# Patient Record
Sex: MALE
[De-identification: names, ages, dates, MRNs, and addresses within clinical notes are randomized per-mention and may not be internally consistent; named-entity substitution may affect disease eponyms.]

---

## 2020-03-13 ENCOUNTER — HOSPITAL ENCOUNTER (INPATIENT)
Dept: HOSPITAL 92 - ERS | Age: 50
LOS: 4 days | Discharge: HOME HEALTH | DRG: 493 | End: 2020-03-17
Attending: SPECIALIST | Admitting: SPECIALIST
Payer: COMMERCIAL

## 2020-03-13 VITALS — BODY MASS INDEX: 44.3 KG/M2

## 2020-03-13 DIAGNOSIS — I25.2: ICD-10-CM

## 2020-03-13 DIAGNOSIS — S00.81XA: ICD-10-CM

## 2020-03-13 DIAGNOSIS — S82.52XA: ICD-10-CM
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
## 2020-03-13 DIAGNOSIS — S52.501A: ICD-10-CM

## 2020-03-13 DIAGNOSIS — I10: ICD-10-CM
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
## 2020-03-13 DIAGNOSIS — S82.141A: Primary | ICD-10-CM

## 2020-03-13 DIAGNOSIS — E11.9: ICD-10-CM

## 2020-03-13 DIAGNOSIS — Z88.8: ICD-10-CM

## 2020-03-13 DIAGNOSIS — Z79.4: ICD-10-CM

## 2020-03-13 DIAGNOSIS — Z90.49: ICD-10-CM

## 2020-03-13 LAB
ALBUMIN SERPL BCG-MCNC: 3.7 G/DL (ref 3.5–5)
ALP SERPL-CCNC: 90 U/L (ref 40–110)
ALT SERPL W P-5'-P-CCNC: 96 U/L (ref 8–55)
ANION GAP SERPL CALC-SCNC: 14 MMOL/L (ref 10–20)
APTT PPP: 27 SEC (ref 22.9–36.1)
AST SERPL-CCNC: 67 U/L (ref 5–34)
BASOPHILS # BLD AUTO: 0.1 THOU/UL (ref 0–0.2)
BASOPHILS NFR BLD AUTO: 0.5 % (ref 0–1)
BILIRUB SERPL-MCNC: 0.3 MG/DL (ref 0.2–1.2)
BUN SERPL-MCNC: 16 MG/DL (ref 8.9–20.6)
CALCIUM SERPL-MCNC: 8.7 MG/DL (ref 7.8–10.44)
CHLORIDE SERPL-SCNC: 105 MMOL/L (ref 98–107)
CO2 SERPL-SCNC: 23 MMOL/L (ref 22–29)
CREAT CL PREDICTED SERPL C-G-VRATE: 0 ML/MIN (ref 70–130)
EOSINOPHIL # BLD AUTO: 0.7 THOU/UL (ref 0–0.7)
EOSINOPHIL NFR BLD AUTO: 5.8 % (ref 0–10)
GLOBULIN SER CALC-MCNC: 3.3 G/DL (ref 2.4–3.5)
GLUCOSE SERPL-MCNC: 116 MG/DL (ref 70–105)
HGB BLD-MCNC: 14.8 G/DL (ref 14–18)
INR PPP: 0.9
LIPASE SERPL-CCNC: 17 U/L (ref 8–78)
LYMPHOCYTES # BLD: 2.3 THOU/UL (ref 1.2–3.4)
LYMPHOCYTES NFR BLD AUTO: 19.2 % (ref 21–51)
MCH RBC QN AUTO: 28.2 PG (ref 27–31)
MCV RBC AUTO: 87.5 FL (ref 78–98)
MONOCYTES # BLD AUTO: 1 THOU/UL (ref 0.11–0.59)
MONOCYTES NFR BLD AUTO: 8.7 % (ref 0–10)
NEUTROPHILS # BLD AUTO: 7.8 THOU/UL (ref 1.4–6.5)
NEUTROPHILS NFR BLD AUTO: 65.8 % (ref 42–75)
PLATELET # BLD AUTO: 207 THOU/UL (ref 130–400)
POTASSIUM SERPL-SCNC: 4.2 MMOL/L (ref 3.5–5.1)
PROTHROMBIN TIME: 11.8 SEC (ref 12–14.7)
RBC # BLD AUTO: 5.24 MILL/UL (ref 4.7–6.1)
SODIUM SERPL-SCNC: 138 MMOL/L (ref 136–145)
WBC # BLD AUTO: 11.8 THOU/UL (ref 4.8–10.8)

## 2020-03-13 PROCEDURE — 83735 ASSAY OF MAGNESIUM: CPT

## 2020-03-13 PROCEDURE — 83036 HEMOGLOBIN GLYCOSYLATED A1C: CPT

## 2020-03-13 PROCEDURE — 94760 N-INVAS EAR/PLS OXIMETRY 1: CPT

## 2020-03-13 PROCEDURE — 36416 COLLJ CAPILLARY BLOOD SPEC: CPT

## 2020-03-13 PROCEDURE — 83690 ASSAY OF LIPASE: CPT

## 2020-03-13 PROCEDURE — 85025 COMPLETE CBC W/AUTO DIFF WBC: CPT

## 2020-03-13 PROCEDURE — 85730 THROMBOPLASTIN TIME PARTIAL: CPT

## 2020-03-13 PROCEDURE — 96361 HYDRATE IV INFUSION ADD-ON: CPT

## 2020-03-13 PROCEDURE — 29125 APPL SHORT ARM SPLINT STATIC: CPT

## 2020-03-13 PROCEDURE — 96374 THER/PROPH/DIAG INJ IV PUSH: CPT

## 2020-03-13 PROCEDURE — 86900 BLOOD TYPING SEROLOGIC ABO: CPT

## 2020-03-13 PROCEDURE — 80053 COMPREHEN METABOLIC PANEL: CPT

## 2020-03-13 PROCEDURE — 80048 BASIC METABOLIC PNL TOTAL CA: CPT

## 2020-03-13 PROCEDURE — C1769 GUIDE WIRE: HCPCS

## 2020-03-13 PROCEDURE — 96375 TX/PRO/DX INJ NEW DRUG ADDON: CPT

## 2020-03-13 PROCEDURE — 86901 BLOOD TYPING SEROLOGIC RH(D): CPT

## 2020-03-13 PROCEDURE — 86850 RBC ANTIBODY SCREEN: CPT

## 2020-03-13 PROCEDURE — 83605 ASSAY OF LACTIC ACID: CPT

## 2020-03-13 PROCEDURE — 36415 COLL VENOUS BLD VENIPUNCTURE: CPT

## 2020-03-13 PROCEDURE — S0020 INJECTION, BUPIVICAINE HYDRO: HCPCS

## 2020-03-13 PROCEDURE — S0028 INJECTION, FAMOTIDINE, 20 MG: HCPCS

## 2020-03-13 PROCEDURE — 93010 ELECTROCARDIOGRAM REPORT: CPT

## 2020-03-13 PROCEDURE — 71045 X-RAY EXAM CHEST 1 VIEW: CPT

## 2020-03-13 PROCEDURE — 76000 FLUOROSCOPY <1 HR PHYS/QHP: CPT

## 2020-03-13 PROCEDURE — 85610 PROTHROMBIN TIME: CPT

## 2020-03-13 PROCEDURE — G0390 TRAUMA RESPONS W/HOSP CRITI: HCPCS

## 2020-03-13 PROCEDURE — 96376 TX/PRO/DX INJ SAME DRUG ADON: CPT

## 2020-03-13 PROCEDURE — 27760 CLTX MEDIAL ANKLE FX: CPT

## 2020-03-13 PROCEDURE — 84100 ASSAY OF PHOSPHORUS: CPT

## 2020-03-13 PROCEDURE — C1713 ANCHOR/SCREW BN/BN,TIS/BN: HCPCS

## 2020-03-13 PROCEDURE — 93005 ELECTROCARDIOGRAM TRACING: CPT

## 2020-03-13 RX ADMIN — DOCUSATE SODIUM 50 MG AND SENNOSIDES 8.6 MG SCH: 8.6; 5 TABLET, FILM COATED ORAL at 22:05

## 2020-03-13 RX ADMIN — FAMOTIDINE SCH MG: 10 INJECTION, SOLUTION INTRAVENOUS at 22:05

## 2020-03-13 NOTE — RAD
RIGHT KNEE FOUR VIEWS:

3/13/20

 

HISTORY: 

Injury right knee pain. 

 

FINDINGS/IMPRESSION: 

There is a comminuted fracture involving the proximal tibia with fracture lines extending into the la
teral and medial tibial plateaus. There is hemarthrosis. 

 

POS: [facility]

## 2020-03-13 NOTE — RAD
Portable frontal chest radiograph:

3/13/2020



COMPARISON: 5/21/2008



HISTORY: Preoperative patient



FINDINGS: There is mild elevation of the right hemidiaphragm and mild prominence of the cardiac silho
uette. No pneumothorax or pleural fluid. No focal consolidation or alveolar edema.



IMPRESSION: No acute findings..



Reported By: Doni Mcguire 

Electronically Signed:  3/13/2020 5:53 PM

## 2020-03-13 NOTE — RAD
Radiograph left ankle 3 views:



DATE:

3/13/2020



Time:

3:27 PM



HISTORY:

49-year-old male with acute traumatic left ankle pain due to fall from ladder.



FINDINGS:

Fracture across midportion of medial malleolus, with approximately 20-30% bone width lateral displace
ment, and slight varus rotation, of distal fragment. No fracture of lateral or posterior malleolus.

Except for the inferior medial aspect of the ankle mortise, which is widened because of the displaced
 fracture, the rest of the ankle mortise is congruent. Bilateral medial and lateral soft tissue

edema, especially medial. Talar dome maintained.



IMPRESSION:

Acute, traumatic, displaced medial malleolus fracture.



Reported By: Nicko Michele 

Electronically Signed:  3/13/2020 4:06 PM

## 2020-03-13 NOTE — RAD
Left wrist 3 views:

3/13/2020



COMPARISON: None available



HISTORY: Fall, trauma, pain



FINDINGS: A 3 view examination of the left wrist is provided. There is dorsal soft tissue swelling. N
o displaced fracture or evidence of dislocation is seen.



On the oblique view there is questionable lucency involving the distal left radius medially which cou
ld represent overlapping osseous structures or subtle fracture lucency.



IMPRESSION: No displaced fracture is seen. There is significant dorsal soft tissue swelling with a po
ssible subtle nondisplaced distal left radial fracture on oblique imaging. Recommend immobilization

and follow-up imaging in 7-10 days.



Reported By: Doni Mcguire 

Electronically Signed:  3/13/2020 5:44 PM

## 2020-03-14 LAB
ANION GAP SERPL CALC-SCNC: 13 MMOL/L (ref 10–20)
BASOPHILS # BLD AUTO: 0.1 THOU/UL (ref 0–0.2)
BASOPHILS NFR BLD AUTO: 0.7 % (ref 0–1)
BUN SERPL-MCNC: 13 MG/DL (ref 8.9–20.6)
CALCIUM SERPL-MCNC: 8.3 MG/DL (ref 7.8–10.44)
CHLORIDE SERPL-SCNC: 106 MMOL/L (ref 98–107)
CO2 SERPL-SCNC: 23 MMOL/L (ref 22–29)
CREAT CL PREDICTED SERPL C-G-VRATE: 233 ML/MIN (ref 70–130)
EOSINOPHIL # BLD AUTO: 0.7 THOU/UL (ref 0–0.7)
EOSINOPHIL NFR BLD AUTO: 6.7 % (ref 0–10)
GLUCOSE SERPL-MCNC: 129 MG/DL (ref 70–105)
HGB BLD-MCNC: 14.3 G/DL (ref 14–18)
LYMPHOCYTES # BLD: 3.1 THOU/UL (ref 1.2–3.4)
LYMPHOCYTES NFR BLD AUTO: 30.4 % (ref 21–51)
MAGNESIUM SERPL-MCNC: 2 MG/DL (ref 1.6–2.6)
MCH RBC QN AUTO: 29.8 PG (ref 27–31)
MCV RBC AUTO: 88.7 FL (ref 78–98)
MONOCYTES # BLD AUTO: 1.1 THOU/UL (ref 0.11–0.59)
MONOCYTES NFR BLD AUTO: 10.9 % (ref 0–10)
NEUTROPHILS # BLD AUTO: 5.3 THOU/UL (ref 1.4–6.5)
NEUTROPHILS NFR BLD AUTO: 51.3 % (ref 42–75)
PLATELET # BLD AUTO: 177 THOU/UL (ref 130–400)
POTASSIUM SERPL-SCNC: 4.3 MMOL/L (ref 3.5–5.1)
RBC # BLD AUTO: 4.79 MILL/UL (ref 4.7–6.1)
SODIUM SERPL-SCNC: 138 MMOL/L (ref 136–145)
WBC # BLD AUTO: 10.3 THOU/UL (ref 4.8–10.8)

## 2020-03-14 PROCEDURE — 0QSG04Z REPOSITION RIGHT TIBIA WITH INTERNAL FIXATION DEVICE, OPEN APPROACH: ICD-10-PCS | Performed by: ORTHOPAEDIC SURGERY

## 2020-03-14 PROCEDURE — 0QSH04Z REPOSITION LEFT TIBIA WITH INTERNAL FIXATION DEVICE, OPEN APPROACH: ICD-10-PCS | Performed by: ORTHOPAEDIC SURGERY

## 2020-03-14 RX ADMIN — FAMOTIDINE SCH MG: 10 INJECTION, SOLUTION INTRAVENOUS at 09:21

## 2020-03-14 RX ADMIN — FAMOTIDINE SCH MG: 10 INJECTION, SOLUTION INTRAVENOUS at 20:11

## 2020-03-14 RX ADMIN — DOCUSATE SODIUM 50 MG AND SENNOSIDES 8.6 MG SCH: 8.6; 5 TABLET, FILM COATED ORAL at 09:21

## 2020-03-14 RX ADMIN — CEFAZOLIN SODIUM SCH MLS: 2 SOLUTION INTRAVENOUS at 17:12

## 2020-03-14 RX ADMIN — DOCUSATE SODIUM 50 MG AND SENNOSIDES 8.6 MG SCH TAB: 8.6; 5 TABLET, FILM COATED ORAL at 20:11

## 2020-03-14 NOTE — PRG
DATE OF SERVICE:  03/14/2020



SUBJECTIVE:  The patient was seen this evening.  He is postoperative day 0 after

fixation of bilateral lower extremity fractures.  He is on Dilaudid PCA and he

reports his pain is well controlled. 



OBJECTIVE:  VITAL SIGNS:  Temperature 98.1, pulse 92, respirations 16, oxygen

saturation 96% on 4 L nasal cannula, blood pressure 124/70. 

GENERAL:  Well-appearing middle-aged male, lying in bed with no signs of acute

distress. 

PULMONARY:  Equal chest rise and fall.  No signs of acute respiratory distress.



ASSESSMENT:  

1. Status post fall from ladder about 6 feet.

2. Possible left radius fracture.

3. Right tibial plateau fracture.

4. Left medial malleolus fracture.

5. Abrasions to face.

6. History of myocardial infarction, diabetes, and hypertension.



PLAN:  Continue current diet and pain regimen.  Continue physical and occupational

therapy.  We will start Lovenox tomorrow for DVT prophylaxis.  He is to be in neuro

chair starting tomorrow and then after that, possibly a wheelchair.  He is pending

placement at acute rehab facility. 







Job ID:  [id number]

## 2020-03-14 NOTE — HP
REQUESTING PHYSICIAN:  Nurse practitioner, Lamar Krishnan.



CONSULTS:  Orthopedic Surgery, Dr. Bal.



PRIMARY CARE PHYSICIAN:  Dr. Montenegro.



CHIEF COMPLAINT:  Fall from a ladder approximately 6-8 feet.



HISTORY OF PRESENT ILLNESS:  This is a 49-year-old gentleman, who presented to 
the

emergency room via EMS after falling from a ladder.  The patient was attempting 
to

trim a tree when another limb fell causing the ladder to fall.  The patient 
denies

losing any consciousness.  The patient denied feeling weak, dizzy, or having any

shortness of breath prior to falling.  The patient reports a branch, it did hit 
his

forehead when he was cutting down a limb.  The patient reported hearing a pop 
to his

right knee and reported immediate pain.  The patient also had pain to his left

ankle.  The patient was given morphine, hydrocodone, and Zofran.  The patient 
was

also given 1 L of normal saline in the emergency room.  The patient's left upper

extremity was splinted, left lower extremity splinted and right lower extremity

placed in a knee immobilizer.  Trauma Service was asked to admit the patient.  
The

patient also sustained an abrasion to his nose and right eyebrow that was not 
able

to be sutured.  Steri-Strips were applied in the emergency room. 



REVIEW OF SYSTEMS:  A 10-point review of systems is negative unless otherwise

indicated in the above HPI. 



PAST MEDICAL HISTORY:  Hypertension, diabetes, myocardial infarction at age 31 
due

to an electrical imbalance from his brain and heart.  The patient denies having 
any

surgery and reports that he did not have any blockage in his heart.  Reports 
that

his cardiologist at that time was Dr. Parish. 



PAST SURGICAL HISTORY:  Appendectomy, tonsillectomy, and nasal surgery.



SOCIAL HISTORY:  Drinks occasionally alcohol.  Denies any drug use, denies any

tobacco use. 



ALLERGIES:  LISINOPRIL CAUSES COUGH.



CURRENT MEDICATIONS:  

1. Glucosamine, metformin 500 mg 2 times a day.

2. Losartan 50 mg once a day.



OBJECTIVE:  VITAL SIGNS:  Blood pressure 118/64, pulse 77, respirations 16, 
SpO2 of

96% on room air, temperature 98.3. 

GENERAL:  Middle aged male, well-appearing, no acute distress. 

HEENT:  Head is normocephalic, abrasion to right eyebrow with Steri-Strips in 
place,

abrasion to nose, midface stable, oropharynx unremarkable, mucous membranes 
moist. 

NECK:  No cervical spine tenderness, normal range of motion.  Trachea is 
midline. 

CHEST:  Equal chest rise and fall, no obvious injuries, bilateral breath sounds

clear with no wheezing, rales, or rhonchi. 

CARDIAC:  Regular rate, regular rhythm, no murmur, occasional PVC on cardiac

monitor. 

ABDOMEN:  Obese, soft, nontender. 

PELVIS:  Stable. 

EXTREMITIES:  Left upper extremity splinted.  Left lower extremity splinted.  
Right

lower extremity in a knee immobilizer.  Distal pulses intact in all extremities,

normal sensation and movement in all extremities. 

NEUROLOGIC:  No focal deficits.  GCS 15.  Cranial nerves are intact.



LABORATORY DATA:  WBC 11.8, RBC 5.24, hemoglobin 14.8, hematocrit 45.9, 
platelets

207.  PT 11.8, INR 0.9, APTT 27.0.  Sodium 138, potassium 4.2, chloride 105, 
BUN 16,

creatinine 0.81, estimated GFR greater than 90.  Glucose 116, lactate 1.9, 
calcium

8.7, total bilirubin 0.3, AST 67, ALT 96, alkaline phos 90, albumin 3.7, lipase 
17. 



DIAGNOSTIC DATA:  Ankle x-ray, impression:  Left traumatic displaced medial

malleolus fracture. 



Right knee x-ray, impression:  Comminuted fracture involved in the proximal 
tibia

with fracture lines extending into the lateral and medial tibial plateaus. 



Left wrist x-ray, impression:  Possible subtle nondisplaced distal left radial

fracture on oblique imaging. 



Chest x-ray, impression:  Mild elevation of the right hemidiaphragm and mild

prominence of the cardiac silhouette.  No pneumothorax or pleural fluid.  No

consolidation or alveolar edema.  No acute findings. 



IMPRESSION:  

1. Fall from ladder, approximately 6-8 feet.

2. Facial abrasions.

3. Left medial malleolar fracture.

4. Right tibial plateau fracture.

5. Left distal radius fracture.

6. Acute traumatic pain.

7. History of hypertension, diabetes type 2, myocardial infarction with no 
blockage.



PLAN:  Admit the patient to the surgical floor.  Pain control.  The patient 
will be

n.p.o. after midnight.  Dr. Bal plans to take the patient to the OR 
tomorrow for

repair of his fractures.  PT, OT to evaluate and treat postop.  A rehab screen 
will

be placed in case the patient does need additional rehab.  The plan will be

discussed with the attending.  The plan was discussed with the patient and 
family,

who agrees. 







Job ID:  [id number]



MTDD

## 2020-03-14 NOTE — CON
DATE OF CONSULTATION:  03/14/2020



HISTORY OF PRESENT ILLNESS:  Mr. Espinoza is a 49-year-old male who fell

approximately 8 feet off a ladder and immediate pain in the right knee, left ankle,

and left wrist.  The patient had x-rays, which showed a comminuted fracture of the

medial and lateral tibial plateaus of the right knee, displaced fracture of the

medial malleolus of the left ankle, and a nondisplaced fracture of the distal aspect

of the radius on the left wrist.  The patient has no neurologic complaints in any of

his extremities. 



PAST MEDICAL HISTORY/MEDICAL ILLNESSES:  

1. Hypertension.

2. Diabetes.

3. Had a myocardial infarction at age 31 due to electrical imbalance from his brain

and heart. 



PAST SURGICAL HISTORY:  

1. Appendectomy.

2. Tonsillectomy.

3. Nasal surgery.



ALLERGIES:  TO LISINOPRIL.



CURRENT MEDICATIONS:  

1. Glucosamine.

2. Metformin.

3. Losartan.



SOCIAL HISTORY:  The patient drinks occasional alcohol.  He denies tobacco or drug

use. 



PHYSICAL EXAMINATION:

GENERAL:  The patient is a pleasant male, alert and oriented x3, cooperative with

the examination. 

VITAL SIGNS:  Blood pressure 118/64, pulse 77, respiratory rate 16, O2 saturation

96% on room air, and temperature 98.3. 

HEENT:  Abrasion over the right eyebrow, otherwise is normal. 

NECK:  Nontender to palpation. 

BACK:  Thoracic and lumbar spine nontender. 

LUNGS:  Clear bilaterally. 

HEART:  Regular rate and rhythm. 

ABDOMEN:  Soft and nontender.  Bowel sounds positive. 

EXTREMITIES:  Left upper extremity is splinted.  Right lower extremity shows

swelling over the right knee and into the leg.  The left ankle shows swelling,

particularly in the medial aspect of the ankle.  Both lower extremities are

neurovascularly intact.  Both upper extremities also are neurovascularly intact. 



LABORATORY DATA:  White count of 8.8, hemoglobin 14.8, and hematocrit 45.9.

Creatinine 0.81, BUN 16, and glucose 116. 



IMPRESSION:  

1. Comminuted medial and lateral tibial plateau fracture of the right knee.

2. Displaced medial malleolus fracture of the left ankle.

3. Possible nondisplaced left distal radius fracture of the left wrist.

4. Diabetes.

5. Hypertension.

6. History of myocardial infarction.



PLAN:  The patient will require open reduction and internal fixation of the right

medial and lateral tibial plateaus as well as the left medial malleolus.  The left

wrist has been placed in a splint, which he will continue with.  We will obtain

additional x-rays later.  Potential risks with the condition of surgery include, but

are not limited to infection, bleeding, pain, damage to blood vessels or nerves,

nonunion, malunion, the patient may require additional surgery, DVT and PE

formation.  The patient will need to be nonweightbearing on the left ankle for about

2 months and on the right knee for about 3 months.  He should be nonweightbearing

across the left wrist until the fracture is healed as well. 







Job ID:  [id number]

## 2020-03-14 NOTE — OP
DATE OF PROCEDURE:  03/14/2020



PREOPERATIVE DIAGNOSES:  

1. Displaced medial and lateral tibial plateau fracture of the right knee.

2. Displaced left medial malleolus fracture of the left ankle.



POSTOPERATIVE DIAGNOSES:  

1. Displaced medial and lateral tibial plateau fracture of the right knee.

2. Displaced medial malleolus fracture of the left ankle.



PROCEDURE PERFORMED:  

1. Open reduction and internal fixation of medial and lateral tibial plateau

fracture, right knee. 

2. Open reduction and internal fixation of medial malleolus fracture of the left

ankle. 



ANESTHESIA:  General.



DESCRIPTION OF PROCEDURE:  The patient was given preoperative IV antibiotics, taken

to the operating room, placed in supine position.  Satisfactory general anesthesia

was performed.  The right lower extremity was sterilely prepped and draped in the

usual fashion.  After exsanguination, tourniquet was raised to 350 mmHg at the right

thigh.  A curvilinear incision was made on the anterolateral aspect of the knee and

proximal leg over the lateral aspect of the proximal tibia.  Blunt and sharp

dissection was made down to the lateral aspect of the proximal tibia and the tibial

plateau region.  A curvilinear incision was then made on the medial aspect.  A

hockey-stick type incision was made down the anteromedial aspect of the proximal leg

and the periosteum and ligaments were left intact on the medial aspect.  A large

bone clamp was used under fluoroscopic visualization.  The medial and lateral

plateau fractures, which were splayed out, were brought together and the fragments

were reduced and then internally fixed using a Synthes 3.5 four-hole LCP proximal

tibial plate on the lateral aspect and 3.5 four-hole LCP plate on the proximal

tibial plate medially.  A 3.5 cortical screw was used to pull the plate to the tibia

and the rest of the screws were 3.5 locking screws.  There were 4 proximal locking

screws laterally and 3 additional locking screws in the metaphyseal region.  On the

medial aspect, there were three 3.5 locking screws proximally with 4 additional

locking screws.  This again was all performed under fluoroscopic visualization and

showed good alignment of the medial and lateral tibial plateau fractures and proper

placement of the plate and screws.  The wounds were then copiously irrigated with

antibiotic solution and they were closed using #1 Vicryl to close the fascia over

the plates and then 0 Vicryl for the fat and subcutaneous tissue, and skin was

closed with skin staples.  Medial and lateral incisions were then infiltrated with a

total of 20 mL of 0.5% Marcaine with 1% lidocaine with epinephrine.  Sterile

dressing was applied.  Tourniquet was released.  Attention was then turned to the

left ankle.  The left foot, ankle, and leg were then sterilely prepped and draped in

the usual fashion.  After exsanguination, tourniquet at the proximal left calf was

raised to 250 mmHg.  A longitudinal incision was made over the medial malleolar

region and the medial malleolus initially was pulled down and the ankle joint was

irrigated with antibiotic solution.  It was then reduced and internally fixed using

two 4.0 cannulated screws.  Again, this was all performed under fluoroscopic

visualization, showed anatomic alignment of the medial malleolus and proper

reduction of the ankle joint.  The ankle was then tested for stability under

fluoroscopy and was very stable.  The wound was then irrigated with antibiotic

solution and closed using 0 Vicryl for the fat and subcutaneous tissue, and the skin

was closed skin staples.  This wound was also infiltrated with 10 mL of 0.5%

Marcaine and 1% lidocaine with epinephrine.  Sterile dressing was applied.

Tourniquet was released.  The patient was then placed back in his knee immobilizer

on the right.  He will be placed in a tall boot on the left.  The patient was then

awakened, extubated, and transferred to recovery room in stable condition. 



ESTIMATED BLOOD LOSS:  50 mL.



COMPLICATIONS:  None.



TOURNIQUET TIME:  Total tourniquet time on the right thigh was 56 minutes and total

tourniquet time on the left proximal calf was 26 minutes. 







Job ID:  [id number]

## 2020-03-14 NOTE — HP
I am dictating this at 09:40 in the morning, but I saw the patient initially at 7

o'clock this morning and have been in the operating room subsequently.  He is a

49-year-old male who fell from 6 feet off a ladder and sustained orthopedic

injuries.  He is a diabetic who has been well controlled previously and currently.

Full history and physical examination were dictated yesterday by Dee Steele, nurse

practitioner.  Dr. Bal has seen the patient in orthopedic consultation and plans

to proceed with surgery this morning.  I have reviewed the history and performed

physical examination and reviewed all studies.  My findings agree with those of Ms. Steele. 







Job ID:  [id number]

## 2020-03-14 NOTE — PRG
DATE OF SERVICE:  03/13/2020



SUBJECTIVE:  The patient was seen this evening during rounds.  He had just arrived

from the emergency department.  He was complaining of pain in his bilateral lower

extremities.  He was in no signs of acute distress. 



OBJECTIVE:  VITAL SIGNS:  Temperature 98.3, pulse 101, respirations 20, oxygen

saturation 92% on room air, blood pressure 149/52. 

GENERAL:  Well-appearing middle-aged male, sitting up in bed with no signs of acute

distress. 

PULMONARY:  Equal chest rise and fall.  No signs of acute respiratory distress. 

EXTREMITIES:  2+ pulses in all extremities.  Gross motor and sensation are intact.

He has splint to the bilateral lower and left upper extremity that are clean, dry,

and intact. 



ASSESSMENT:  

1. Status post fall from ladder about 6 feet.

2. Possible left radius fracture.

3. Right tibial plateau fracture.

4. Left medial malleolus fracture.

5. Face abrasions.

6. History of myocardial infarction.

7. Diabetes.

8. Hypertension.



PLAN:  Continue diabetic diet until midnight where he will be n.p.o.  He has LR at

125 an hour.  We will hold his metformin and continue the insulin sliding scale.

Restart losartan in the morning with hold parameters.  He is going to go to the OR

with Dr. Bal to address multiple extremity fractures.  EKG has not been

completed.  We will do that in the morning before he goes to the OR.  Postop, he

will work with Physical and Occupational Therapy and likely need placement at either

an acute rehab or skilled nursing facility as he has multiple extremity fractures. 







Job ID:  [id number]

## 2020-03-14 NOTE — PRG
DATE OF SERVICE:  03/14/2020



SUBJECTIVE:  The patient remains on the surgical floor.  Awake, alert, no distress.

The patient is postop day 0, status post open reduction and internal fixation of

medial and lateral tibial plateau fracture of right knee and displaced medial

malleolus fracture of the left ankle.  The patient's pain is currently well

controlled at this time on a PCA fentanyl pump.  The patient is tolerating a diet at

this time.  The patient voices no complaints.  The patient has good urinary output. 



OBJECTIVE:  VITAL SIGNS:  Temperature 98.6, pulse 84, respirations 20, SpO2 of 96%

on room air, blood pressure 124/75. 

GENERAL:  Well-appearing middle aged male, lying in hospital bed, in no acute

distress. 

RESPIRATORY:  Equal chest rise and fall.  Bilateral breath sounds clear.  No

distress. 

ABDOMEN:  Soft, nontender, nondistended. 

EXTREMITIES:  Moves all extremities.  Splint to left upper extremity, knee

immobilizer to right lower extremity, and splint to the left lower extremity. 

NEUROLOGIC:  No focal deficits.



LABORATORY DATA:  WBC 10.3, RBC 4.79, hemoglobin 14.3, hematocrit 42.5, platelets

177.  Sodium 138, potassium 4.3, chloride 106, BUN 13, creatinine 0.77, estimated

GFR greater than 90, glucose 128, calcium 8.3, phosphorus 4.0, magnesium 2.0. 



ASSESSMENT:  

1. Status post fall from ladder about 6 feet.

2. Possible left radius fracture, splinted.

3. Right tibial plateau fracture, postop repair.

4. Left medial malleolus fracture, postop repair.

5. Facial abrasions.

6. History of myocardial infarction, diabetes, and hypertension.



PLAN:  Continue diabetic diet.  The patient is nonweightbearing to all extremities

except his right upper extremity.  Continue PCA fentanyl pump for pain.  We will

have Physical Therapy and Occupational Therapy work with the patient.  The patient

will most likely need rehab versus skilled nursing facility due to his multiple

extremity fractures.  Plan has been discussed with the patient's family who agrees. 







Job ID:  [id number]

## 2020-03-14 NOTE — RAD
LEFT KNEE TWO VIEWS WITH INTRAOPERATIVE FLUOROSCOPY:



HISTORY:

Knee fracture.



FINDINGS:

Intraoperative fluoroscopy was provided for internal fixation, as performed by Dr. Bal. Spot fluo
roscopic images show compression sideplate, medial and lateral, and multiple screws transfixing the

medial and lateral tibial plateaus. Alignment is anatomic.



Fluoroscopy time 22 seconds.



Transcribed Date/Time: 3/14/2020 12:40 PM



Reported By: GILBERT Chan 

Electronically Signed:  3/14/2020 1:46 PM

## 2020-03-14 NOTE — RAD
LEFT ANKLE TWO VIEWS WITH INTRAOPERATIVE FLUOROSCOPY:



HISTORY:

Fracture.



FINDINGS:

Intraoperative fluoroscopy was provided for internal fixation, as performed by Dr. Bal. Spot fluo
roscopic images show two long screws transfixing the medial malleolus, in anatomic alignment.



Fluoroscopy time: 15 seconds.



Transcribed Date/Time: 3/14/2020 12:40 PM



Reported By: GILBERT Chan 

Electronically Signed:  3/14/2020 1:46 PM

## 2020-03-15 LAB
ANION GAP SERPL CALC-SCNC: 11 MMOL/L (ref 10–20)
BASOPHILS # BLD AUTO: 0 THOU/UL (ref 0–0.2)
BASOPHILS NFR BLD AUTO: 0.1 % (ref 0–1)
BUN SERPL-MCNC: 10 MG/DL (ref 8.9–20.6)
CALCIUM SERPL-MCNC: 8 MG/DL (ref 7.8–10.44)
CHLORIDE SERPL-SCNC: 105 MMOL/L (ref 98–107)
CO2 SERPL-SCNC: 25 MMOL/L (ref 22–29)
CREAT CL PREDICTED SERPL C-G-VRATE: 239 ML/MIN (ref 70–130)
EOSINOPHIL # BLD AUTO: 0.1 THOU/UL (ref 0–0.7)
EOSINOPHIL NFR BLD AUTO: 0.5 % (ref 0–10)
GLUCOSE SERPL-MCNC: 149 MG/DL (ref 70–105)
HGB BLD-MCNC: 12.7 G/DL (ref 14–18)
LYMPHOCYTES # BLD: 1.9 THOU/UL (ref 1.2–3.4)
LYMPHOCYTES NFR BLD AUTO: 14.7 % (ref 21–51)
MAGNESIUM SERPL-MCNC: 2.1 MG/DL (ref 1.6–2.6)
MCH RBC QN AUTO: 29 PG (ref 27–31)
MCV RBC AUTO: 88.8 FL (ref 78–98)
MONOCYTES # BLD AUTO: 1.5 THOU/UL (ref 0.11–0.59)
MONOCYTES NFR BLD AUTO: 11.5 % (ref 0–10)
NEUTROPHILS # BLD AUTO: 9.3 THOU/UL (ref 1.4–6.5)
NEUTROPHILS NFR BLD AUTO: 73.2 % (ref 42–75)
PLATELET # BLD AUTO: 158 THOU/UL (ref 130–400)
POTASSIUM SERPL-SCNC: 4.3 MMOL/L (ref 3.5–5.1)
RBC # BLD AUTO: 4.37 MILL/UL (ref 4.7–6.1)
SODIUM SERPL-SCNC: 137 MMOL/L (ref 136–145)
WBC # BLD AUTO: 12.7 THOU/UL (ref 4.8–10.8)

## 2020-03-15 RX ADMIN — MULTIPLE VITAMINS W/ MINERALS TAB SCH TAB: TAB at 09:05

## 2020-03-15 RX ADMIN — ASPIRIN SCH MG: 81 TABLET ORAL at 09:04

## 2020-03-15 RX ADMIN — DOCUSATE SODIUM 50 MG AND SENNOSIDES 8.6 MG SCH: 8.6; 5 TABLET, FILM COATED ORAL at 21:09

## 2020-03-15 RX ADMIN — DOCUSATE SODIUM 50 MG AND SENNOSIDES 8.6 MG SCH TAB: 8.6; 5 TABLET, FILM COATED ORAL at 09:05

## 2020-03-15 RX ADMIN — CEFAZOLIN SODIUM SCH MLS: 2 SOLUTION INTRAVENOUS at 02:19

## 2020-03-15 RX ADMIN — INSULIN HUMAN PRN UNIT: 100 INJECTION, SOLUTION PARENTERAL at 16:53

## 2020-03-15 RX ADMIN — INSULIN HUMAN PRN UNIT: 100 INJECTION, SOLUTION PARENTERAL at 13:45

## 2020-03-15 NOTE — PRG
DATE OF SERVICE:  03/15/2020



SUBJECTIVE:  The patient remains on the surgical floor, awake, alert, sitting up in

neuro chair.  The patient reports that his pain is well controlled with a PCA

fentanyl pump.  The patient had no overnight events.  The patient continues to

tolerate a diabetic diet.  The patient is nonweightbearing to left upper and lower

extremity and also right lower extremity. 



OBJECTIVE:  VITAL SIGNS:  Temperature 98.4, pulse 75, respirations 18, SpO2 of 93%

on room air, and blood pressure 117/73. 

GENERAL:  Middle aged gentleman, awake, alert, sitting up in the neuro chair, in no

acute distress. 

HEENT:  Normocephalic.  Steri-Strips to superficial, right eyebrow. 

PULMONARY:  Equal chest rise and fall, bilateral breath sounds clear. 

ABDOMEN:  Soft, nontender, and nondistended. 

EXTREMITIES:  Left upper extremity splinted.  Right lower extremity in a knee

immobilizer.  Left lower extremity splinted.  No gross edema.  Cap refill less than

2 seconds, sensation intact in all extremities.  No focal deficits. 



LABORATORY DATA:  WBC 12.7, RBC 4.37, hemoglobin 12.7, hematocrit 38.8, and

platelets 158.  Sodium 137, potassium 4.3, chloride 105, carbon dioxide 25, BUN 10,

creatinine 0.75, estimated GFR greater than 90, and glucose 149.  Hemoglobin A1c

8.2.  Calcium 8.0, phosphorus 3.6, and magnesium 2.1. 



DIAGNOSTICS:  There is no new diagnostics to review today.



ASSESSMENT:  

1. Status post fall from ladder about 6 feet.  Possible left radius fracture,

splinted. 

2. Right tibial plateau fractures, status post repair.

3. Left medial malleolus fracture, status post repair.

4. Abrasions to face.

5. History of myocardial infarction, diabetes, and hypertension.



PLAN:  Continue current diet and pain regimen.  Continue bowel regimen.  Continue to

have the patient use incentive spirometer every hour while awake.  Continue Physical

and Occupational Therapy.  We will have the patient up in the neuro chair as much as

possible during the day as the patient is nonambulatory.  Continue DVT prophylaxis.

We will start to wean the patient's PCA pump.  The plan was discussed with the

patient's family, who agrees.  The plan was discussed with the attending, who

agrees. 







Job ID:  [id number]

## 2020-03-15 NOTE — PRG
DATE OF SERVICE:  03/15/2020



SUBJECTIVE:  The patient is 1 day status post ORIF of the medial and lateral right

tibial plateaus and ORIF of the left medial malleolus.  The patient also sustained a

nondisplaced left radius fracture.  The patient is in fairly good spirits.  He was

able to get out of bed and sit in a wheelchair this morning. 



OBJECTIVE:  VITAL SIGNS:  The patient has been afebrile, O2 saturation 93% on room

air, blood pressure is good at 117/73. 

EXTREMITIES:  All 4 extremities are neurovascularly intact.



LABORATORY DATA:  Laboratory today shows hemoglobin 12.7, hematocrit 38.8.  Glucose

is slightly elevated at 149.  Rest of the chemistries are normal. 



PLAN:  The patient will continue to work with Physical and Occupational Therapy to

help in transfers.  He will need to be nonweightbearing on both lower extremities

and across the left wrist.  He will have to use left elbow and shoulder on the left

upper extremity.  The patient is considering going into a nursing home or skilled

nursing facility until he has become more independent. 







Job ID:  [id number]

## 2020-03-16 LAB
BASOPHILS # BLD AUTO: 0 THOU/UL (ref 0–0.2)
BASOPHILS NFR BLD AUTO: 0.3 % (ref 0–1)
EOSINOPHIL # BLD AUTO: 0.6 THOU/UL (ref 0–0.7)
EOSINOPHIL NFR BLD AUTO: 5.7 % (ref 0–10)
HGB BLD-MCNC: 12.4 G/DL (ref 14–18)
LYMPHOCYTES # BLD: 2.8 THOU/UL (ref 1.2–3.4)
LYMPHOCYTES NFR BLD AUTO: 25.3 % (ref 21–51)
MCH RBC QN AUTO: 29.6 PG (ref 27–31)
MCV RBC AUTO: 89.5 FL (ref 78–98)
MONOCYTES # BLD AUTO: 1.4 THOU/UL (ref 0.11–0.59)
MONOCYTES NFR BLD AUTO: 12.1 % (ref 0–10)
NEUTROPHILS # BLD AUTO: 6.3 THOU/UL (ref 1.4–6.5)
NEUTROPHILS NFR BLD AUTO: 56.6 % (ref 42–75)
PLATELET # BLD AUTO: 152 THOU/UL (ref 130–400)
RBC # BLD AUTO: 4.19 MILL/UL (ref 4.7–6.1)
WBC # BLD AUTO: 11.2 THOU/UL (ref 4.8–10.8)

## 2020-03-16 RX ADMIN — DOCUSATE SODIUM 50 MG AND SENNOSIDES 8.6 MG SCH TAB: 8.6; 5 TABLET, FILM COATED ORAL at 20:10

## 2020-03-16 RX ADMIN — ASPIRIN SCH MG: 81 TABLET ORAL at 08:58

## 2020-03-16 RX ADMIN — MULTIPLE VITAMINS W/ MINERALS TAB SCH TAB: TAB at 08:58

## 2020-03-16 RX ADMIN — DOCUSATE SODIUM 50 MG AND SENNOSIDES 8.6 MG SCH TAB: 8.6; 5 TABLET, FILM COATED ORAL at 08:57

## 2020-03-16 NOTE — PRG
DATE OF SERVICE:  03/15/2020



SUBJECTIVE:  The patient was seen this evening on rounds.  He was sitting up in bed,

no signs of acute distress.  He reported pain is well controlled.  He worked with

Physical Therapy. 



OBJECTIVE:  VITAL SIGNS:  The patient is afebrile, hemodynamically stable. 

GENERAL:  Well-appearing middle-aged male, sitting up in bed with no signs of acute

distress. 



ASSESSMENT:  

1. Status post fall from ladder about 6 feet.

2. Left radius fracture.

3. Right tibial plateau fracture.

4. Left medial malleolus fracture.

5. Facial abrasions.

6. History of myocardial infarction, diabetes, and hypertension.



PLAN:  Continue current diet and pain regimen.  Continue physical and occupational

therapy.  The patient likely needs placement at acute rehab facility.  PT to

evaluate tomorrow. 







Job ID:  [id number]

## 2020-03-16 NOTE — PRG
DATE OF SERVICE:  03/16/2020



SUBJECTIVE:  The patient was seen this evening during rounds.  He was lying in bed,

resting and asleep, but no signs of acute distress.  Nursing reported no acute

events. 



OBJECTIVE:  VITAL SIGNS:  The patient is afebrile, hemodynamically stable. 

GENERAL:  Well-appearing, middle-aged male, lying in bed, asleep, with no signs of

acute distress. 



ASSESSMENT:  

1. Status post fall from ladder, 6 feet.

2. Left radius fracture.

3. Right tibial plateau fracture.

4. Left medial malleolus fracture.

5. Facial abrasions.

6. History of myocardial infarction, diabetes, and hypertension.



PLAN:  Continue current diet and pain regimen.  Continue physical and occupational

therapy.  The patient will likely be discharged home tomorrow with home health. 







Job ID:  [id number]

## 2020-03-16 NOTE — PDOC.GSPN
Surgery Progress Note: Subj





- Subjective


Patient reports: feels better


Narrative: 


Patient is POD #2 from an ORIF of the right tibia and left ankle stats post 

mechanical fall from a ladder. Patient is recovering well with pain well 

controlled on current pain regimen. Pain is at 1/10. Patient voiced concern 

about increased HbA1c as well as glucose in the 300s; however, this last one 

can be attributed to diet.








Surgery Progress Note: Obj





- Vital signs


Vital signs: 


 Vital Signs - Most Recent











Temp Pulse Resp BP Pulse Ox


 


 98.5 F   83   16   135/76   93 L


 


 03/16/20 07:47  03/16/20 07:47  03/16/20 07:47  03/16/20 07:47  03/16/20 08:04














- Physical Exam


General: no distress, well nourished


Cardiovascular: regular rate and rhythm


Respiratory: clear to auscultation, normal expansion, normal respiratory effort

, breath sounds present


Abdomen: soft, non tender, nondistended, positive bowel sounds


Psychiatric: memory intact, oriented to time, oriented to person, oriented to 

place, speech is normal


Additional exam: 


Patient was sitting upright in a chair. Both of his lower extremeties showed 

post-operative changes. 








Surgery Progress Note: Results





- Labs


Result Diagrams: 


 03/16/20 05:17





 03/15/20 05:11


Lab results: 


 Laboratory Results - last 24 hr











  03/16/20 03/16/20





  05:17 05:25


 


WBC  11.2 H 


 


RBC  4.19 L 


 


Hgb  12.4 L 


 


Hct  37.5 L 


 


MCV  89.5 


 


MCH  29.6 


 


MCHC  33.1 


 


RDW  13.4 


 


Plt Count  152 


 


MPV  8.6 


 


Neutrophils %  56.6 


 


Lymphocytes %  25.3 


 


Monocytes %  12.1 H 


 


Eosinophils %  5.7 


 


Basophils %  0.3 


 


Neutrophils #  6.3 


 


Lymphocytes #  2.8 


 


Monocytes #  1.4 H 


 


Eosinophils #  0.6 


 


Basophils #  0.0 


 


POC Glucose   96














Surgery Progress Note: A/P





- Problem


(1) Fall from ladder


Code(s): W11.XXXA - FALL ON AND FROM LADDER, INITIAL ENCOUNTER   Status: Acute 

  


Assessment and Plan: 


1. Patient is POD #2 from ORIF of right tibia plateau and left medial malleolus


2. Patient is tolerating pain extremely well. DC Fentanyl PCA, change pain meds 

to PO. We will keep patient today to see how he tolerates pain on PO meds.


3. Patient opted for home PT instead of in-facility. He has a good support 

system and will go home tomorrow. 








(2) Diabetes mellitus


Code(s): E11.9 - TYPE 2 DIABETES MELLITUS WITHOUT COMPLICATIONS   Status: Acute

   


Assessment and Plan: 


1. Continue patient on current Metformin regimen. Patient will watch his diet.


2. Recheck blood glucose in the morning before discharge


3. follow up with PCP








Addendum - Physician





- Physician Attestation


Date/Time: 03/18/20 1364





I personally performed or re-performed the physical examination and medical 

decision making. I have verified all student documentation or findings, 

including history, physical exam and/or medical decision making.

## 2020-03-17 VITALS — DIASTOLIC BLOOD PRESSURE: 75 MMHG | SYSTOLIC BLOOD PRESSURE: 117 MMHG | TEMPERATURE: 97.9 F

## 2020-03-17 RX ADMIN — MULTIPLE VITAMINS W/ MINERALS TAB SCH TAB: TAB at 08:01

## 2020-03-17 RX ADMIN — DOCUSATE SODIUM 50 MG AND SENNOSIDES 8.6 MG SCH TAB: 8.6; 5 TABLET, FILM COATED ORAL at 08:01

## 2020-03-17 RX ADMIN — ASPIRIN SCH MG: 81 TABLET ORAL at 07:59
